# Patient Record
Sex: MALE | Race: WHITE
[De-identification: names, ages, dates, MRNs, and addresses within clinical notes are randomized per-mention and may not be internally consistent; named-entity substitution may affect disease eponyms.]

---

## 2019-01-31 ENCOUNTER — HOSPITAL ENCOUNTER (EMERGENCY)
Dept: HOSPITAL 54 - ER | Age: 49
Discharge: HOME | End: 2019-01-31
Payer: MEDICAID

## 2019-01-31 VITALS — WEIGHT: 180 LBS | BODY MASS INDEX: 23.86 KG/M2 | HEIGHT: 73 IN

## 2019-01-31 VITALS — DIASTOLIC BLOOD PRESSURE: 81 MMHG | SYSTOLIC BLOOD PRESSURE: 148 MMHG

## 2019-01-31 DIAGNOSIS — F17.200: ICD-10-CM

## 2019-01-31 DIAGNOSIS — S20.212A: Primary | ICD-10-CM
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
## 2019-01-31 DIAGNOSIS — Z90.49: ICD-10-CM

## 2019-01-31 DIAGNOSIS — I10: ICD-10-CM
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
## 2019-01-31 PROCEDURE — 99283 EMERGENCY DEPT VISIT LOW MDM: CPT

## 2019-01-31 PROCEDURE — 71100 X-RAY EXAM RIBS UNI 2 VIEWS: CPT

## 2019-03-25 ENCOUNTER — HOSPITAL ENCOUNTER (EMERGENCY)
Dept: HOSPITAL 10 - FTE | Age: 49
Discharge: HOME | End: 2019-03-25
Payer: COMMERCIAL

## 2019-03-25 ENCOUNTER — HOSPITAL ENCOUNTER (EMERGENCY)
Dept: HOSPITAL 91 - FTE | Age: 49
Discharge: HOME | End: 2019-03-25
Payer: COMMERCIAL

## 2019-03-25 VITALS — HEART RATE: 68 BPM | DIASTOLIC BLOOD PRESSURE: 85 MMHG | SYSTOLIC BLOOD PRESSURE: 177 MMHG | RESPIRATION RATE: 18 BRPM

## 2019-03-25 VITALS
HEIGHT: 72 IN | BODY MASS INDEX: 20.9 KG/M2 | HEIGHT: 72 IN | WEIGHT: 154.32 LBS | BODY MASS INDEX: 20.9 KG/M2 | WEIGHT: 154.32 LBS

## 2019-03-25 DIAGNOSIS — Z23: ICD-10-CM
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
## 2019-03-25 DIAGNOSIS — S61.012A: Primary | ICD-10-CM
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
## 2019-03-25 DIAGNOSIS — I10: ICD-10-CM

## 2019-03-25 PROCEDURE — 90471 IMMUNIZATION ADMIN: CPT

## 2019-03-25 PROCEDURE — 12001 RPR S/N/AX/GEN/TRNK 2.5CM/<: CPT

## 2019-03-25 PROCEDURE — 73140 X-RAY EXAM OF FINGER(S): CPT

## 2019-03-25 PROCEDURE — 99283 EMERGENCY DEPT VISIT LOW MDM: CPT

## 2019-03-25 PROCEDURE — 90715 TDAP VACCINE 7 YRS/> IM: CPT

## 2019-03-25 RX ADMIN — CLOSTRIDIUM TETANI TOXOID ANTIGEN (FORMALDEHYDE INACTIVATED), CORYNEBACTERIUM DIPHTHERIAE TOXOID ANTIGEN (FORMALDEHYDE INACTIVATED), BORDETELLA PERTUSSIS TOXOID ANTIGEN (GLUTARALDEHYDE INACTIVATED), BORDETELLA PERTUSSIS FILAMENTOUS HEMAGGLUTININ ANTIGEN (FORMALDEHYDE INACTIVATED), BORDETELLA PERTUSSIS PERTACTIN ANTIGEN, AND BORDETELLA PERTUSSIS FIMBRIAE 2/3 ANTIGEN 1 ML: 5; 2; 2.5; 5; 3; 5 INJECTION, SUSPENSION INTRAMUSCULAR at 18:48

## 2019-03-25 RX ADMIN — LIDOCAINE HYDROCHLORIDE 1 ML: 20 INJECTION, SOLUTION INFILTRATION; PERINEURAL at 18:49

## 2019-03-25 NOTE — ERD
ER Documentation


Chief Complaint


Chief Complaint





Left thumb laceration





HPI


This is a 49-year-old male with history of hypertension who is brought in by 


rescue ambulance after striking left thumb with an ax earlier today.  Patient 


states that he was chopping wood birthday party when he accidentally cut the tip


of his left first digit off.  Patient admits to bleeding and pain.  Patient 


denies any tingling, numbness, lack sensation, decreased range of motion all the


symptoms.  Unsure if tetanus is up-to-date.  Does not take blood thinners daily.





ROS


All systems reviewed and are negative except as per history of present illness.





Medications


Home Meds


Active Scripts


Acetaminophen* (Tylophen*) 500 Mg Capsule, 1 CAP PO Q6H PRN for PAIN AND OR 


ELEVATED TEMP, #20 CAP


   Prov:LAYNE PINEDO PA-C         3/25/19


Sulfamethoxazole/Trimethoprim* (Bactrim Ds* Tablet) 1 Each Tablet, 1 TAB PO BID,


#14 TAB


   Prov:LAYNE PINEDO PA-C         3/25/19





PMhx/Soc


Hx Cardiac Disorders:  Yes (HTN)


Hx Alcohol Use:  Yes (OCCASIONALLY)


Hx Substance Use:  No


Hx Tobacco Use:  Yes


Smoking Status:  Current some day smoker





Physical Exam


Vitals





Vital Signs


  Date      Temp  Pulse  Resp  B/P (MAP)   Pulse Ox  O2          O2 Flow    FiO2


Time                                                 Delivery    Rate


   3/25/19  98.0     68    18      177/85        96  Room Air


     19:56                          (115)


   3/25/19  98.1     80    18      168/80       100


     18:20                          (109)





Physical Exam


Physical Exam


Vitals signs: Reviewed by me.


General: Well developed, well nourished, in no acute distress. Patient is awake 


and alert.


Head: Normocephalic, atraumatic.


Eyes: Normal conjunctiva, Pupils PERRLA, EOM intact grossly


ENT: Pharynx is clear, Moist mucous membranes, external ears, nose and mouth 


normal


Neck: Supple, no masses, lymphadenopathy or JVD


Respiratory: Clear to auscultation bilaterally with no wheezing, rhonchi, rales,


no distress


Cardiovascular: RRR, no murmurs, rubs, or gallops


 Upper Extremity -left


 Skin:         There is an avulsion of patient's left first digit, no bone is 


exposed, there is some active bleeding,


 Compartments:   Soft


 Motor:         Full active range of motion hand/fingers


 Sensation:      Intact shoulder/pinky/middle finger/thumb web space


 Bones:       Nontender forearm/wrist/hand


 Snuffbox:      Nontender


 Joints:         No effusion


 Pulses/Perfusion:    2+ radial, Capillary refill < 2 seconds


Radial ulnar and median nerve tested for sensory motor deficit without any 


dysfunction


Neurologic: Alert and oriented, moving all extremities, normal speech, no focal 


weakness, no cerebellar signs. Normal mentation


Skin: warm and dry, No rash


Psych: Normal mood


Results 24 hrs





Current Medications


 Medications
   Dose
          Sig/Emil
       Start Time
   Status  Last


 (Trade)       Ordered        Route
 PRN     Stop Time              Admin
Dose


                              Reason                                Admin


 Diphtheria/
   0.5 ml         ONCE ONCE
     3/25/19       DC           3/25/19


Tetanus/Acell                 IM*
           19:00
                       18:48




 Pertussis
                                 3/25/19 19:01


(Adacel)


 Lidocaine
     20 ml          ONCE  STAT
    3/25/19       DC       



(Xylocaine                    INJ
           18:35



2%
  (Mdv) 20                                3/25/19 18:36


ml)








Procedures/MDM





PROCEDURES:


Laceration Repair by me:


Anesthesia:                             Digital block with 2% lidocaine


Location:                                  Left first digit


Tendon/Joint/Nerves:             No injury


Foreign body:                           None detected after copious irrigation 


and exploration


Technique:                              2 figure-of-eight sutures were used to 


stop bleeding


Complexity:                             No subcutaneous sutures/mucosal 


repair/edge excision








Patient's bleeding was easily controlled in the department and there is no 


indication of anemia.


No evidence of compartment syndrome, neurologic injury, vascular injury, open 


joint, tendon laceration, or foreign body.


Patient is appropriate for outpatient follow up.





48 hour wound check.  Scar minimization instructions given.





ER COURSE:


The patient was stable throughout ED course.


I kept the patient and/or family informed of laboratory and diagnostic imaging 


results throughout the emergency room course.





The patient was promptly evaluated and a treatment plan was devised based on H&P


and other data. This plan was discussed with the patient who agreed and had no 


further questions or concerns prior to discharge.





MEDICAL DECISION MAKIN-year male presents ED with avulsion of left first digit.  X-ray showed no 


fractures or dislocation.  Bleeding of patient's left first digit was stopped 


with 2 figure-of-eight sutures.   No evidence of compartment syndrome, 


neurologic injury, vascular injury, open joint, tendon laceration, fracture, 


dislocation, or foreign body.  Patient's vitals are stable and pt can be managed


with close out patient follow up. Advised patient to return to ED or to be seen 


by primary care for a 48 hour wound check.  Pt will also need to return to ED or


be seen by primary care provider to have sutures removed in 7-10 days. Return to


ED with any worsening symptoms and if patient starts experiencing fever, chills,


purulent drainage, warmth, swelling at laceration site this may be indications 


that wound has become infected and patient may need antibiotics.


 





DISPOSITION PLAN:


We discussed follow up with the patient's primary care doctor within 24 to 48 


hours.  Patient counseled regarding my diagnostic impression and care plan. 


Prior to discharge all questions answered. Pt agrees with treatment plan and 


understands strict return precautions. Precautionary instructions provided 


including instructions to return to the ER if not improving or for any worsening


or changing symptoms or concerns.





SPECIALIST FOLLOW UP RECOMMENDED: None


Patient has been advised to follow up with primary care in 1-2 days.





Disclaimer: Inadvertent spelling and grammatical errors are likely due to 


EHR/dictation software use and do not reflect on the overall quality of patient 


care. Also, please note that the electronic time recorded on this note does not 


necessarily reflect the actual time of the patient encounter.





Blood Pressure Assessment: Patient's blood pressure was elevated (>120/80) but 


appears stable without evidence of hypertension emergency or urgency.  The 


patient was counseled about the risks of hypertension and urged to pursue 


outpatient monitoring and therapy within a week with their primary care jaei


anCaprice Otoole


Diagnosis:  


   Primary Impression:  


   Finger avulsion


   Encounter type:  initial encounter  Qualified Codes:  S61.209A - Unspecified 


   open wound of unspecified finger without damage to nail, initial encounter


Condition:  Stable


Patient Instructions:  Laceration, All, Nail Avulsion, Complete


Referrals:  


[facility]


YOU HAVE RECEIVED A MEDICAL SCREENING EXAM AND THE RESULTS INDICATE THAT YOU DO 


NOT HAVE A CONDITION THAT REQUIRES URGENT TREATMENT IN THE EMERGENCY DEPARTMENT.





FURTHER EVALUATION AND TREATMENT OF YOUR CONDITION CAN WAIT UNTIL YOU ARE SEEN 


IN YOUR DOCTORS OFFICE WITHIN THE NEXT 1-2 DAYS. IT IS YOUR RESPONSIBILITY TO 


MAKE AN APPOINTMENT FOR FOLOW-UP CARE.





IF YOU HAVE A PRIMARY DOCTOR


--you should call your primary doctor and schedule an appointment





IF YOU DO NOT HAVE A PRIMARY DOCTOR YOU CAN CALL OUR PHYSICIAN REFERRAL HOTLINE 


AT


 [phone] 





IF YOU CAN NOT AFFORD TO SEE A PHYSICIAN YOU CAN CHOSE FROM THE FOLLOWING 


[facility] [phone] [address] [facility] [place]





[facility] [phone] [address] [facility]





[facility] [phone] [address] [facility] [phone] [address] [facility] [phone] [address] [facility]. [facility] [phone] [address]





Additional Instructions:  


Return in 2 days or be seen by her primary care physician for wound check.  Will


need to have sutures removed in roughly 10 days.





Patient advised to return to the ED immediately for new or worsening symptoms. 


Patient advised to follow up with primary care provider in the next 24-48 hours.


Patient verbalized understanding and agrees with treatment plan and course of 


action.





If patient has no primary care they may follow up with one of the [facility] 


clinics listed on the following page or one of the options listed below








[facility]


[address] [place] [address]





or





[facility]


[address] [place] [address]





or





[facility]


[address] [place] [address]











LAYNE PINEDO PA-C          Mar 25, 2019 20:14

## 2019-04-01 ENCOUNTER — HOSPITAL ENCOUNTER (EMERGENCY)
Dept: HOSPITAL 54 - ER | Age: 49
Discharge: HOME | End: 2019-04-01
Payer: MEDICAID

## 2019-04-01 VITALS — SYSTOLIC BLOOD PRESSURE: 119 MMHG | DIASTOLIC BLOOD PRESSURE: 76 MMHG

## 2019-04-01 VITALS — BODY MASS INDEX: 23.61 KG/M2 | WEIGHT: 184 LBS | HEIGHT: 74 IN

## 2019-04-01 DIAGNOSIS — S61.012D: Primary | ICD-10-CM

## 2019-04-01 DIAGNOSIS — F17.200: ICD-10-CM

## 2019-04-01 DIAGNOSIS — I10: ICD-10-CM

## 2019-04-01 DIAGNOSIS — Z90.49: ICD-10-CM
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
## 2019-04-01 PROCEDURE — 99284 EMERGENCY DEPT VISIT MOD MDM: CPT

## 2019-04-01 PROCEDURE — A6402 STERILE GAUZE <= 16 SQ IN: HCPCS

## 2019-04-01 PROCEDURE — 64450 NJX AA&/STRD OTHER PN/BRANCH: CPT

## 2019-04-01 NOTE — NUR
DISCHARGE INSTRUCTIONS GIVEN AND PATIENT VERBALIZED UNDERSTANDING. 
PRESCRIPTIONS GIVEN TO PATIENT. PATIENT LEFT ER AMBULATORY IN STABLE CONDITION. 
NO ACUTE DISTRESS.

## 2019-06-30 ENCOUNTER — HOSPITAL ENCOUNTER (EMERGENCY)
Dept: HOSPITAL 54 - ER | Age: 49
Discharge: HOME | End: 2019-06-30
Payer: MEDICAID

## 2019-06-30 VITALS
HEIGHT: 74 IN | DIASTOLIC BLOOD PRESSURE: 85 MMHG | WEIGHT: 181 LBS | SYSTOLIC BLOOD PRESSURE: 149 MMHG | BODY MASS INDEX: 23.23 KG/M2
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
## 2019-06-30 DIAGNOSIS — Z90.49: ICD-10-CM
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
## 2019-06-30 DIAGNOSIS — M25.532: ICD-10-CM

## 2019-06-30 DIAGNOSIS — S20.212A: Primary | ICD-10-CM
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
## 2019-06-30 DIAGNOSIS — I10: ICD-10-CM

## 2019-06-30 DIAGNOSIS — F17.200: ICD-10-CM

## 2019-06-30 DIAGNOSIS — M25.522: ICD-10-CM
